# Patient Record
Sex: FEMALE | Race: WHITE | NOT HISPANIC OR LATINO | Employment: PART TIME | URBAN - NONMETROPOLITAN AREA
[De-identification: names, ages, dates, MRNs, and addresses within clinical notes are randomized per-mention and may not be internally consistent; named-entity substitution may affect disease eponyms.]

---
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
## 2025-04-10 ENCOUNTER — OFFICE VISIT (OUTPATIENT)
Dept: OBSTETRICS AND GYNECOLOGY | Facility: CLINIC | Age: 20
End: 2025-04-10
Payer: COMMERCIAL

## 2025-04-10 VITALS
SYSTOLIC BLOOD PRESSURE: 100 MMHG | BODY MASS INDEX: 25.76 KG/M2 | DIASTOLIC BLOOD PRESSURE: 58 MMHG | WEIGHT: 145.4 LBS | HEIGHT: 63 IN

## 2025-04-10 DIAGNOSIS — Z30.433 ENCOUNTER FOR IUD REMOVAL AND REINSERTION: Primary | ICD-10-CM

## 2025-04-10 NOTE — PROGRESS NOTES
IUD Removal and Immediate Reinsertion    Patient's last menstrual period was 04/09/2025 (exact date).     Date of procedure:  4/10/2025    Risks and benefits discussed? Yes   All questions answered? Yes  Consents given by The patient  Written consent obtained? Yes  Reason for removal: Device expiration    Local anesthesia used:  No    Procedure documentation:    A speculum was placed in order to view the cervix. The IUD string was easily seen and the tip was noted to be protruding through the external os. The string was grasped and the IUD was removed with mild difficulty. The IUD did not appear to be adherent to the uterine cavity. It was removed intact.    The cervix was cleansed with an antiseptic solution. The anterior lip of the cervix was grasped with a single tooth tenaculum and the uterine cavity was gently sounded. There was no significant difficulty passing the sound through the cervix. Cervical dilation did not need to be performed prior to placing the IUD. The uterus sounded to 8 cm. The Kyleena was then prepared per the manufacturers instructions.    The Kyleena was advanced to a point 1 cm from the fundus and then the arms were released from the sheath. The device was advanced to the fundus and the device was released fully from the sheath. The string was cut 4 cm in length. Bleeding from the cervix was scant.    She tolerated the procedure without difficulty.     Post procedure instructions: It was reviewed that the Kyleena will not alter the timing of when she bleeds but it may decrease the quantity of flow and cramps. Roughly 30% of people will be amenorrheic over time. Efficacy rate of 99.2% over 5 years. Spontaneous expulsion rate of 1-2%.Call/ return to clinic with fever or excessive bleeding or pain.      RTC 4-5 weeks for string check.    Monique Quintana MD   Obstetrics and Gynecology  [facility]